# Patient Record
Sex: FEMALE | Race: WHITE | URBAN - METROPOLITAN AREA
[De-identification: names, ages, dates, MRNs, and addresses within clinical notes are randomized per-mention and may not be internally consistent; named-entity substitution may affect disease eponyms.]

---

## 2024-09-03 ENCOUNTER — OFFICE VISIT (OUTPATIENT)
Dept: ORTHOPEDICS CLINIC | Facility: CLINIC | Age: 44
End: 2024-09-03

## 2024-09-03 VITALS — WEIGHT: 152 LBS | BODY MASS INDEX: 24.14 KG/M2 | HEIGHT: 66.54 IN

## 2024-09-03 DIAGNOSIS — M16.12 PRIMARY OSTEOARTHRITIS OF LEFT HIP: Primary | ICD-10-CM

## 2024-09-03 PROCEDURE — 99204 OFFICE O/P NEW MOD 45 MIN: CPT | Performed by: ORTHOPAEDIC SURGERY

## 2024-09-03 PROCEDURE — 3008F BODY MASS INDEX DOCD: CPT | Performed by: ORTHOPAEDIC SURGERY

## 2024-09-03 RX ORDER — ALBUTEROL SULFATE 90 UG/1
2 AEROSOL, METERED RESPIRATORY (INHALATION) EVERY 4 HOURS PRN
COMMUNITY
Start: 2024-07-08

## 2024-09-03 RX ORDER — CELECOXIB 200 MG/1
CAPSULE ORAL
COMMUNITY
Start: 2024-07-17

## 2024-09-03 RX ORDER — MELOXICAM 15 MG/1
15 TABLET ORAL DAILY
COMMUNITY
Start: 2024-04-17

## 2024-09-03 NOTE — H&P
NURSING INTAKE COMMENTS:   Chief Complaint   Patient presents with    Hip Pain     Left - onset last November - no injury - started to have pain - was seen by chiropractor - has a disc with x-rays from 3/2024 - rates pain as 2-3/10 at all the time - with any activities pain may go up to 9-10/10 on and off - has radiating pain going down the side os the leg -        HPI: This 44 year old female presents today for a fourth opinion regarding left hip osteoarthritis.  She was a runner for many years.  She developed left hip pain with decreased motion.  She has seen 3 other orthopedic surgeons all of whom said she has hip arthritis.  She was recommended hip replacement by 2 of them, and 1 recommended trying to wait due to her young age.      She is in very good shape physically.  She still participates in yoga, Pilates, hiking, biking, and sewing.  She lives independently with her  and 2 children ages 7 and 9.  She drives.  She does not use a cane.  She has tried some chiropractic for the left hip as well.  She has no right hip complaint.    History reviewed. No pertinent past medical history.  History reviewed. No pertinent surgical history.  Current Outpatient Medications   Medication Sig Dispense Refill    albuterol 108 (90 Base) MCG/ACT Inhalation Aero Soln Inhale 2 puffs into the lungs every 4 (four) hours as needed.      celecoxib 200 MG Oral Cap TAKE 1 CAPSULE BY MOUTH ONCE A DAY TAKE 1 CAPSULE BY MOUTH ONCE A DAY FOR 1 MONTH      Meloxicam 15 MG Oral Tab Take 1 tablet (15 mg total) by mouth daily.       No Known Allergies  History reviewed. No pertinent family history.  No family Hx of DVT/PE    Social History     Occupational History    Not on file   Tobacco Use    Smoking status: Never    Smokeless tobacco: Never   Vaping Use    Vaping status: Never Used   Substance and Sexual Activity    Alcohol use: Not on file    Drug use: Never    Sexual activity: Not on file        Review of Systems:  GENERAL: feels  generally well, no significant weight loss or weight gain  SKIN: no ulcerated or worrisome skin lesions  EYES:denies blurred vision or double vision  HEENT: denies new nasal congestion, sinus pain or ST  LUNGS: denies shortness of breath  CARDIOVASCULAR: denies chest pain  GI: no hematemesis, no worsening heartburn, no diarrhea  : no dysuria, no blood in urine, no difficulty urinating, no incontinence  MUSCULOSKELETAL: no other musculoskeletal complaints other than in HPI  NEURO: no numbness or tingling, no weakness or balance disorder  PSYCHE: no depression or anxiety  HEMATOLOGIC: no hx of blood dyscrasia, no Hx DVT/PE  ENDOCRINE: no thyroid or diabetes issues  ALL/ASTHMA: no new hx of severe allergy or asthma    Physical Examination:    Ht 5' 6.54\" (1.69 m)   Wt 152 lb (68.9 kg)   BMI 24.14 kg/m²   Constitutional: appears well hydrated, alert and responsive, no acute distress noted  Extremities: The skin around her left hip and trochanter was healthy without rashes or masses.  Musculoskeletal: Right hip internally and externally rotated 45 degrees each.  Left hip only 15 degrees each with groin pain.  No trochanteric tenderness.  No buttocks pain today.  She does get pain at times that wraps from the lateral side of the hip to the groin.  Today was mostly groin pain.  Neurological: Normal motor and sensory bilateral lower extremities.  Leg lengths are equal.    Imaging: She brought x-rays with her which shows near bone-on-bone of the superior aspect of the left hip with a large cam lesion.  The right hip appears within normal limits.      No results found.     No results found for: \"WBC\", \"HGB\", \"PLT\"   No results found for: \"GLU\", \"BUN\", \"CREATSERUM\", \"GFR\", \"GFRNAA\", \"GFRAA\"     Assessment and Plan:  Diagnoses and all orders for this visit:    Primary osteoarthritis of left hip  -     XR FLUOROSCOPIC GUIDANCE NEEDLE PLACEMENT (CPT=77002); Future        Assessment: Above diagnosis.    Plan: I gave her my  opinion.  I told her she should try cortisone injection and remain in good shape physically to try to delay the hip replacement due to her relatively young age.  After discussion of the pros and cons of this versus proceeding to hip replacement, she wished to try an injection.  Prescription was given for fluoroscopic or ultrasound guided left hip injection of cortisone by the radiologist.  If her pain returns or the shot does not help, she will return to the office and we would discuss anterior hip replacement.    Follow Up: No follow-ups on file.    Michael Dillon MD

## 2025-03-20 ENCOUNTER — APPOINTMENT (OUTPATIENT)
Dept: GENERAL RADIOLOGY | Age: 45
End: 2025-03-20
Attending: PHYSICIAN ASSISTANT
Payer: COMMERCIAL

## 2025-03-20 ENCOUNTER — HOSPITAL ENCOUNTER (OUTPATIENT)
Age: 45
Discharge: HOME | End: 2025-03-20
Payer: COMMERCIAL

## 2025-03-20 VITALS
TEMPERATURE: 99 F | RESPIRATION RATE: 18 BRPM | DIASTOLIC BLOOD PRESSURE: 90 MMHG | OXYGEN SATURATION: 97 % | SYSTOLIC BLOOD PRESSURE: 139 MMHG | HEART RATE: 111 BPM

## 2025-03-20 DIAGNOSIS — J45.30 MILD PERSISTENT ASTHMATIC BRONCHITIS WITHOUT COMPLICATION (HCC): Primary | ICD-10-CM

## 2025-03-20 PROCEDURE — 71046 X-RAY EXAM CHEST 2 VIEWS: CPT | Performed by: PHYSICIAN ASSISTANT

## 2025-03-20 PROCEDURE — 99203 OFFICE O/P NEW LOW 30 MIN: CPT | Performed by: PHYSICIAN ASSISTANT

## 2025-03-20 RX ORDER — AZITHROMYCIN 250 MG/1
TABLET, FILM COATED ORAL
Qty: 6 TABLET | Refills: 0 | Status: SHIPPED | OUTPATIENT
Start: 2025-03-20 | End: 2025-03-25

## 2025-03-20 RX ORDER — BENZONATATE 100 MG/1
100 CAPSULE ORAL 3 TIMES DAILY PRN
Qty: 30 CAPSULE | Refills: 0 | Status: SHIPPED | OUTPATIENT
Start: 2025-03-20 | End: 2025-04-19

## 2025-03-20 RX ORDER — PREDNISONE 20 MG/1
40 TABLET ORAL DAILY
Qty: 10 TABLET | Refills: 0 | Status: SHIPPED | OUTPATIENT
Start: 2025-03-20 | End: 2025-03-25

## 2025-03-21 NOTE — ED PROVIDER NOTES
Chief Complaint   Patient presents with    Cough       HPI:     Orly Go is a 45 year old female who presents for evaluation of congestion cough over the last 5 days, sick contacts, daughter with influenza this past week without associated fever to self, taking baseline Trelegy as well as MDI with mild improvement.  Patient denies any antipyretic since onset, afebrile on arrival.  Denies associated dizziness sore throat earache neck pain chest pain shortness of breath abdominal pain vomiting diarrhea dysuria rash.      PFSH    PFSH asessment screens reviewed and agree.  Nurses notes reviewed I agree with documentation.    No family history on file.  Family history reviewed with patient/caregiver and is not pertinent to presenting problem.  Social History     Socioeconomic History    Marital status:      Spouse name: Not on file    Number of children: Not on file    Years of education: Not on file    Highest education level: Not on file   Occupational History    Not on file   Tobacco Use    Smoking status: Never    Smokeless tobacco: Never   Vaping Use    Vaping status: Never Used   Substance and Sexual Activity    Alcohol use: Not on file    Drug use: Never    Sexual activity: Not on file   Other Topics Concern    Not on file   Social History Narrative    Not on file     Social Drivers of Health     Food Insecurity: No Food Insecurity (9/11/2024)    Received from [facility], [place], operated by [facility]    Hunger Vital Sign     Worried About Running Out of Food in the Last Year: Never true     Ran Out of Food in the Last Year: Never true   Transportation Needs: No Transportation Needs (9/11/2024)    Received from [facility], [place], operated by [facility]    PRAPARE - Transportation     Lack of Transportation (Medical): No     Lack of Transportation (Non-Medical): No   Housing Stability: Low Risk  (9/11/2024)    Received from [facility], [place], operated [facility]    Housing Stability Vital Sign     Unable to Pay for Housing in the  Last Year: No     Number of Times Moved in the Last Year: 0     Homeless in the Last Year: No         ROS:   Positive for stated complaint: Chest congestion.  All other systems reviewed and negative except as noted above.  Constitutional and Vital Signs Reviewed.      Physical Exam:     Findings:    /90   Pulse 111   Temp 98.7 °F (37.1 °C) (Oral)   Resp 18   LMP 2025   SpO2 97%   GENERAL: well developed, well nourished, well hydrated, no distress  SKIN: good skin turgor, no obvious rashes  NECK: supple, no adenopathy  EXTREMITIES: no cyanosis or edema. MORENO without difficulty  GI: soft, non-tender, normal bowel sounds  HEAD: normocephalic, atraumatic  EYES: sclera non icteric bilateral, conjunctiva clear  EARS: TMs clear bilaterally. Canals clear.  NOSE: Mild rhinorrhea MMM.  Nasal turbinates: pink, normal mucosa  THROAT: clear, without exudates, uvula midline, and airway patent  LUNGS: No retractions.  Clear to auscultation bilaterally; no rales, rhonchi, or wheezes  NEURO: No focal deficits  PSYCH: Alert and oriented x3.  Answering questions appropriately.  Mood appropriate.    MDM/Assessment/Plan:   Orders for this encounter:    Orders Placed This Encounter    XR CHEST PA + LAT CHEST (KYW=[id number])     Order Specific Question:   What is the Relevant Clinical Indication / Reason for Exam?     Answer:   CHEST CONGESTION     Order Specific Question:   Release to patient     Answer:   Immediate    benzonatate 100 MG Oral Cap     Sig: Take 1 capsule (100 mg total) by mouth 3 (three) times daily as needed for cough.     Dispense:  30 capsule     Refill:  0    predniSONE 20 MG Oral Tab     Sig: Take 2 tablets (40 mg total) by mouth daily for 5 days.     Dispense:  10 tablet     Refill:  0    azithromycin (ZITHROMAX Z-RONNELL) 250 MG Oral Tab     Si mg once followed by 250 mg daily x 4 days     Dispense:  6 tablet     Refill:  0       Labs performed this visit:  No results found for this or any previous  visit (from the past 10 hours).    MDM:  X-ray without infiltrate, patient agrees with supportive measures for likely viral bronchitis, patient though requesting upon discharge atypical coverage for bacterial macrolide provided.  Agrees to continue nebulized and MDI for support as well as postural drainage corticosteroid with saline in between doses.  Happy with plan of care.  Alert nontoxic.    Diagnosis:    ICD-10-CM    1. Mild persistent asthmatic bronchitis without complication ([facility])  J45.30           All results reviewed and discussed with patient.  See AVS for detailed discharge instructions for your condition today.    Follow Up with:  Cesia Valentin DO  [address] [place] [id number]  [phone]    Schedule an appointment as soon as possible for a visit in 1 week  As needed, If symptoms worsen

## 2025-03-21 NOTE — DISCHARGE INSTRUCTIONS
CONTINUE ASHTMA MEDICATION.     FLONASE MORNING AND NIGHT WITH SALINE EVERY 4 HOURS IN BETWEEN.     CONTINUE NEB/MDI MID DAY BY RECOMMENDATION.     READDRESS IF WORSENING OR LINGERING IS SUES.

## 2025-06-11 ENCOUNTER — HOSPITAL ENCOUNTER (OUTPATIENT)
Age: 45
Discharge: HOME | End: 2025-06-11
Payer: COMMERCIAL

## 2025-06-11 VITALS
SYSTOLIC BLOOD PRESSURE: 141 MMHG | RESPIRATION RATE: 20 BRPM | TEMPERATURE: 98 F | DIASTOLIC BLOOD PRESSURE: 94 MMHG | HEART RATE: 100 BPM | OXYGEN SATURATION: 99 %

## 2025-06-11 DIAGNOSIS — R03.0 ELEVATED BLOOD PRESSURE READING: ICD-10-CM
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
## 2025-06-11 DIAGNOSIS — S61.253A OPEN WOUND OF LEFT MIDDLE FINGER DUE TO DOG BITE: Primary | ICD-10-CM

## 2025-06-11 PROCEDURE — 90471 IMMUNIZATION ADMIN: CPT | Performed by: PHYSICIAN ASSISTANT

## 2025-06-11 PROCEDURE — 99213 OFFICE O/P EST LOW 20 MIN: CPT | Performed by: PHYSICIAN ASSISTANT

## 2025-06-11 PROCEDURE — 90715 TDAP VACCINE 7 YRS/> IM: CPT | Performed by: PHYSICIAN ASSISTANT

## 2025-06-11 RX ORDER — IBUPROFEN 600 MG/1
TABLET, FILM COATED ORAL
Qty: 20 TABLET | Refills: 0 | Status: SHIPPED | OUTPATIENT
Start: 2025-06-11

## 2025-06-11 NOTE — ED PROVIDER NOTES
Patient Seen in: [facility]    History     Chief Complaint   Patient presents with    Bite     Stated Complaint: Dog Bite Left hand 3rd finger    HPI    HPI:     45 year old female who presents with chief complaint of dog bite to left third digit.  Onset this morning.  Patient states her fully vaccinated neighbor's dog bit her left third digit.  Patient denies other injury, head/neck injury or pain, decreased range of motion, swelling, ecchymosis, erythema, weakness, paraesthesias, purulent drainage, fever, chills.      Past Medical History[1]        Past Surgical History[2]         Family History[3]    Short Social Hx on File[4]    Review of Systems    Positive for stated complaint: Dog Bite Left hand 3rd finger  Other systems are as noted in HPI.  Constitutional and vital signs reviewed.      All other systems reviewed and negative except as noted above.    PSFH elements reviewed from today and agreed except as otherwise stated in HPI.    Physical Exam     ED Triage Vitals [06/11/25 0846]   BP (!) 141/94   Pulse 100   Resp 20   Temp 98.3 °F (36.8 °C)   Temp src Oral   SpO2 99 %   O2 Device None (Room air)       Current:BP (!) 141/94   Pulse 100   Temp 98.3 °F (36.8 °C) (Oral)   Resp 20   LMP 03/19/2025   SpO2 99%     PULSE OX within normal limits on room air as interpreted by this provider.    Physical Exam      Constitutional: The patient is cooperative. Appears well-developed and well-nourished.  Mild discomfort.  Head: Normocephalic/atraumatic.  Neck: The neck is supple.  No meningeal signs.  Respiratory: Respiratory effort was normal.  There is no stridor.  Air entry is equal.  Cardiovascular: Regular rate and rhythm.  Capillary refill is brisk.   Genitourinary: Not examined.  Lymphatic: No gross lymphadenopathy noted.  Musculoskeletal: Left upper extremity-Left upper extremity without acute bony deformity.  A 4 mm x 3 mm puncture wound is present at the left third digit.  Bleeding  controlled.  No erythema, edema, purulent drainage or warmth.  Left upper extremity nontender to palpation.  Full range of motion of left third digit.  Distal pulses intact.  Capillary refill normal.  Motor intact distally.  Sensory intact distally.  No ecchymosis.  No compartment syndrome.  No edema.  Remainder of musculoskeletal system is grossly intact.  There is no obvious deformity.  Neurological: Gross motor movement is intact in all 4 extremities.  Patient exhibits normal speech.  Skin: Skin is normal to inspection, except as documented.  No obvious bruising.  No obvious rash.    ED Course   Labs Reviewed - No data to display  No procedure performed.  MDM     Differential diagnosis including but not limited to dog bite, laceration, cellulitis, infected wound    Patient declined x-rays of left third digit.    Physical exam remained stable as previously documented.  Physical exam findings discussed with patient.    I have given the patient instructions regarding their diagnoses, expectations, follow up, and ER precautions. I explained to the patient that emergent conditions may arise and to go to the ER for new, worsening or any persistent conditions. I've explained the importance of following up with their doctor as instructed. The patient verbalized understanding of the discharge instructions and plan.    The patient was informed of their elevated blood pressure reading at immediate care.  They were informed of the dangers of undiagnosed and untreated hypertension.  Education regarding lifestyle modifications and the need for appropriate follow-up with their PCP to have their blood pressure re-checked within 24-48 hours was provided.    Disposition and Plan     Clinical Impression:  1. Open wound of left middle finger due to dog bite    2. Elevated blood pressure reading        Disposition:  Discharge    Follow-up:  Cesia Valentin DO  [address] [facility] [address]  [phone]    Call in 1  day  For follow-up, For wound re-check      Medications Prescribed:  Discharge Medication List as of 6/11/2025  9:19 AM        START taking these medications    Details   ibuprofen 600 MG Oral Tab Take 1 tablet (600 mg total) by mouth every 6 hours with food, Normal, Disp-20 tablet, R-0      amoxicillin clavulanate 875-125 MG Oral Tab Take 1 tablet by mouth 2 (two) times daily for 7 days., Normal, Disp-14 tablet, R-0                            [1] No past medical history on file.  [2] No past surgical history on file.  [3] No family history on file.  [4]   Social History  Socioeconomic History    Marital status:    Tobacco Use    Smoking status: Never    Smokeless tobacco: Never   Vaping Use    Vaping status: Never Used   Substance and Sexual Activity    Drug use: Never     Social Drivers of Health     Food Insecurity: No Food Insecurity (9/11/2024)    Received from [facility]    Hunger Vital Sign     Worried About Running Out of Food in the Last Year: Never true     Ran Out of Food in the Last Year: Never true   Transportation Needs: No Transportation Needs (9/11/2024)    Received from [facility]    PRAPARE - Transportation     Lack of Transportation (Medical): No     Lack of Transportation (Non-Medical): No   Housing Stability: Low Risk  (9/11/2024)    Received from [facility]    Housing Stability Vital Sign     Unable to Pay for Housing in the Last Year: No     Number of Times Moved in the Last Year: 0     Homeless in the Last Year: No

## 2025-06-11 NOTE — DISCHARGE INSTRUCTIONS
Keep wound clean, dry and covered  Do not submerge wound in water  May take over-the-counter ibuprofen or Tylenol for pain  Follow-up with primary care physician in 24-48 hours, call for appointment  Return to immediate care or emergency department for any new or worsening symptoms      You had elevated blood pressure today and you need to follow-up with your doctor for repeat blood pressure check  If possible check your blood pressure at home and keep a blood pressure log to bring to your physician